# Patient Record
Sex: FEMALE | Race: WHITE | Employment: OTHER | URBAN - METROPOLITAN AREA
[De-identification: names, ages, dates, MRNs, and addresses within clinical notes are randomized per-mention and may not be internally consistent; named-entity substitution may affect disease eponyms.]

---

## 2017-01-01 ENCOUNTER — TELEPHONE (OUTPATIENT)
Dept: PHARMACY | Facility: CLINIC | Age: 72
End: 2017-01-01

## 2017-01-01 ENCOUNTER — CARE COORDINATION (OUTPATIENT)
Dept: CARE COORDINATION | Age: 72
End: 2017-01-01

## 2017-01-01 ENCOUNTER — TELEPHONE (OUTPATIENT)
Dept: FAMILY MEDICINE CLINIC | Age: 72
End: 2017-01-01

## 2017-01-01 ENCOUNTER — HOSPITAL ENCOUNTER (OUTPATIENT)
Dept: SURGERY | Age: 72
Discharge: HOME | End: 2017-11-02
Attending: OPHTHALMOLOGY | Admitting: OPHTHALMOLOGY

## 2017-01-01 ENCOUNTER — HOSPITAL ENCOUNTER (OUTPATIENT)
Dept: MAMMOGRAPHY | Age: 72
Discharge: HOME | End: 2017-05-10
Attending: FAMILY MEDICINE | Admitting: FAMILY MEDICINE

## 2017-01-01 ENCOUNTER — OFFICE VISIT (OUTPATIENT)
Dept: CARDIOLOGY CLINIC | Age: 72
End: 2017-01-01

## 2017-01-01 ENCOUNTER — OFFICE VISIT (OUTPATIENT)
Dept: FAMILY MEDICINE CLINIC | Age: 72
End: 2017-01-01

## 2017-01-01 ENCOUNTER — HOSPITAL ENCOUNTER (OUTPATIENT)
Dept: WOMENS IMAGING | Age: 72
Discharge: HOME | End: 2017-05-17
Attending: FAMILY MEDICINE | Admitting: FAMILY MEDICINE

## 2017-01-01 ENCOUNTER — CARE COORDINATOR VISIT (OUTPATIENT)
Dept: CARE COORDINATION | Age: 72
End: 2017-01-01

## 2017-01-01 ENCOUNTER — TELEPHONE (OUTPATIENT)
Dept: CARDIOLOGY CLINIC | Age: 72
End: 2017-01-01

## 2017-01-01 VITALS
OXYGEN SATURATION: 96 % | HEIGHT: 61 IN | WEIGHT: 196 LBS | SYSTOLIC BLOOD PRESSURE: 122 MMHG | DIASTOLIC BLOOD PRESSURE: 52 MMHG | HEART RATE: 84 BPM | BODY MASS INDEX: 37 KG/M2

## 2017-01-01 VITALS
RESPIRATION RATE: 19 BRPM | HEART RATE: 86 BPM | SYSTOLIC BLOOD PRESSURE: 132 MMHG | DIASTOLIC BLOOD PRESSURE: 68 MMHG | BODY MASS INDEX: 35.52 KG/M2 | OXYGEN SATURATION: 97 % | WEIGHT: 188 LBS

## 2017-01-01 VITALS
RESPIRATION RATE: 16 BRPM | SYSTOLIC BLOOD PRESSURE: 154 MMHG | OXYGEN SATURATION: 98 % | DIASTOLIC BLOOD PRESSURE: 84 MMHG | HEART RATE: 80 BPM | BODY MASS INDEX: 36.84 KG/M2 | WEIGHT: 195 LBS

## 2017-01-01 VITALS
DIASTOLIC BLOOD PRESSURE: 70 MMHG | WEIGHT: 192 LBS | RESPIRATION RATE: 16 BRPM | SYSTOLIC BLOOD PRESSURE: 122 MMHG | OXYGEN SATURATION: 97 % | BODY MASS INDEX: 36.25 KG/M2 | HEART RATE: 66 BPM | HEIGHT: 61 IN

## 2017-01-01 VITALS
WEIGHT: 183 LBS | HEART RATE: 84 BPM | DIASTOLIC BLOOD PRESSURE: 60 MMHG | BODY MASS INDEX: 34.58 KG/M2 | RESPIRATION RATE: 16 BRPM | SYSTOLIC BLOOD PRESSURE: 130 MMHG | OXYGEN SATURATION: 96 %

## 2017-01-01 VITALS
SYSTOLIC BLOOD PRESSURE: 138 MMHG | RESPIRATION RATE: 16 BRPM | DIASTOLIC BLOOD PRESSURE: 52 MMHG | WEIGHT: 188 LBS | BODY MASS INDEX: 35.5 KG/M2 | HEART RATE: 84 BPM | HEIGHT: 61 IN

## 2017-01-01 VITALS
DIASTOLIC BLOOD PRESSURE: 70 MMHG | BODY MASS INDEX: 35.71 KG/M2 | WEIGHT: 189 LBS | OXYGEN SATURATION: 90 % | SYSTOLIC BLOOD PRESSURE: 134 MMHG | HEART RATE: 72 BPM

## 2017-01-01 VITALS
SYSTOLIC BLOOD PRESSURE: 120 MMHG | HEART RATE: 74 BPM | OXYGEN SATURATION: 96 % | WEIGHT: 182 LBS | RESPIRATION RATE: 17 BRPM | BODY MASS INDEX: 34.39 KG/M2 | DIASTOLIC BLOOD PRESSURE: 68 MMHG

## 2017-01-01 VITALS
BODY MASS INDEX: 35.87 KG/M2 | HEART RATE: 68 BPM | RESPIRATION RATE: 14 BRPM | DIASTOLIC BLOOD PRESSURE: 62 MMHG | SYSTOLIC BLOOD PRESSURE: 144 MMHG | HEIGHT: 61 IN | WEIGHT: 190 LBS

## 2017-01-01 DIAGNOSIS — Z12.31 SCREENING MAMMOGRAM, ENCOUNTER FOR: ICD-10-CM

## 2017-01-01 DIAGNOSIS — G31.84 MILD COGNITIVE IMPAIRMENT WITH MEMORY LOSS: Primary | ICD-10-CM

## 2017-01-01 DIAGNOSIS — E11.42 TYPE 2 DIABETES MELLITUS WITH DIABETIC POLYNEUROPATHY, WITH LONG-TERM CURRENT USE OF INSULIN (HCC): Primary | Chronic | ICD-10-CM

## 2017-01-01 DIAGNOSIS — I20.8 ANGINA OF EFFORT (HCC): ICD-10-CM

## 2017-01-01 DIAGNOSIS — E11.42 TYPE 2 DIABETES MELLITUS WITH DIABETIC POLYNEUROPATHY, WITH LONG-TERM CURRENT USE OF INSULIN (HCC): Chronic | ICD-10-CM

## 2017-01-01 DIAGNOSIS — Z79.4 TYPE 2 DIABETES MELLITUS WITH DIABETIC POLYNEUROPATHY, WITH LONG-TERM CURRENT USE OF INSULIN (HCC): Primary | Chronic | ICD-10-CM

## 2017-01-01 DIAGNOSIS — D50.8 IRON DEFICIENCY ANEMIA SECONDARY TO INADEQUATE DIETARY IRON INTAKE: ICD-10-CM

## 2017-01-01 DIAGNOSIS — F33.41 MAJOR DEPRESSIVE DISORDER, RECURRENT EPISODE, IN PARTIAL REMISSION (HCC): ICD-10-CM

## 2017-01-01 DIAGNOSIS — Z79.4 TYPE 2 DIABETES MELLITUS WITH DIABETIC POLYNEUROPATHY, WITH LONG-TERM CURRENT USE OF INSULIN (HCC): Chronic | ICD-10-CM

## 2017-01-01 DIAGNOSIS — I20.8 ANGINA EFFORT: ICD-10-CM

## 2017-01-01 DIAGNOSIS — I27.20 PULMONARY HTN (HCC): ICD-10-CM

## 2017-01-01 DIAGNOSIS — J43.9 PULMONARY EMPHYSEMA, UNSPECIFIED EMPHYSEMA TYPE (HCC): Chronic | ICD-10-CM

## 2017-01-01 DIAGNOSIS — I25.10 CORONARY ARTERY DISEASE INVOLVING NATIVE CORONARY ARTERY OF NATIVE HEART WITHOUT ANGINA PECTORIS: ICD-10-CM

## 2017-01-01 DIAGNOSIS — H35.00 RETINOPATHY: ICD-10-CM

## 2017-01-01 DIAGNOSIS — I10 ESSENTIAL HYPERTENSION: Chronic | ICD-10-CM

## 2017-01-01 DIAGNOSIS — I25.10 CAD S/P PERCUTANEOUS CORONARY ANGIOPLASTY: Primary | Chronic | ICD-10-CM

## 2017-01-01 DIAGNOSIS — Z23 NEED FOR INFLUENZA VACCINATION: ICD-10-CM

## 2017-01-01 DIAGNOSIS — Z12.31 ENCOUNTER FOR SCREENING MAMMOGRAM FOR BREAST CANCER: ICD-10-CM

## 2017-01-01 DIAGNOSIS — F33.8 SEASONAL AFFECTIVE DISORDER (HCC): ICD-10-CM

## 2017-01-01 DIAGNOSIS — D50.9 IRON DEFICIENCY ANEMIA, UNSPECIFIED IRON DEFICIENCY ANEMIA TYPE: ICD-10-CM

## 2017-01-01 DIAGNOSIS — G31.84 MILD COGNITIVE IMPAIRMENT WITH MEMORY LOSS: ICD-10-CM

## 2017-01-01 DIAGNOSIS — I50.42 CHRONIC COMBINED SYSTOLIC AND DIASTOLIC CHF (CONGESTIVE HEART FAILURE) (HCC): Chronic | ICD-10-CM

## 2017-01-01 DIAGNOSIS — R53.83 FATIGUE, UNSPECIFIED TYPE: ICD-10-CM

## 2017-01-01 DIAGNOSIS — Z98.61 CAD S/P PERCUTANEOUS CORONARY ANGIOPLASTY: Primary | Chronic | ICD-10-CM

## 2017-01-01 DIAGNOSIS — J41.0 SIMPLE CHRONIC BRONCHITIS (HCC): ICD-10-CM

## 2017-01-01 DIAGNOSIS — K52.9 GASTROENTERITIS: ICD-10-CM

## 2017-01-01 DIAGNOSIS — F33.41 MAJOR DEPRESSIVE DISORDER, RECURRENT EPISODE, IN PARTIAL REMISSION (HCC): Chronic | ICD-10-CM

## 2017-01-01 DIAGNOSIS — J41.0 SIMPLE CHRONIC BRONCHITIS (HCC): Primary | ICD-10-CM

## 2017-01-01 DIAGNOSIS — F51.01 PRIMARY INSOMNIA: ICD-10-CM

## 2017-01-01 DIAGNOSIS — R26.81 UNSTABLE GAIT: Primary | ICD-10-CM

## 2017-01-01 DIAGNOSIS — E78.2 MIXED HYPERLIPIDEMIA: Chronic | ICD-10-CM

## 2017-01-01 DIAGNOSIS — H10.13 ALLERGIC CONJUNCTIVITIS, BILATERAL: ICD-10-CM

## 2017-01-01 DIAGNOSIS — K29.01 GASTROINTESTINAL HEMORRHAGE ASSOCIATED WITH ACUTE GASTRITIS: ICD-10-CM

## 2017-01-01 DIAGNOSIS — I35.0 AORTIC STENOSIS, MODERATE: ICD-10-CM

## 2017-01-01 DIAGNOSIS — J96.11 CHRONIC RESPIRATORY FAILURE WITH HYPOXIA (HCC): Chronic | ICD-10-CM

## 2017-01-01 DIAGNOSIS — I27.20 PULMONARY HTN (HCC): Primary | ICD-10-CM

## 2017-01-01 DIAGNOSIS — R26.81 UNSTABLE GAIT: ICD-10-CM

## 2017-01-01 DIAGNOSIS — N39.41 URGE INCONTINENCE: ICD-10-CM

## 2017-01-01 DIAGNOSIS — M17.11 PRIMARY OSTEOARTHRITIS OF RIGHT KNEE: Chronic | ICD-10-CM

## 2017-01-01 DIAGNOSIS — J96.11 CHRONIC RESPIRATORY FAILURE WITH HYPOXIA (HCC): ICD-10-CM

## 2017-01-01 DIAGNOSIS — Z79.4 ENCOUNTER FOR LONG-TERM (CURRENT) USE OF INSULIN (HCC): Chronic | ICD-10-CM

## 2017-01-01 DIAGNOSIS — Z71.89 ENCOUNTER FOR MEDICATION REVIEW AND COUNSELING: Primary | ICD-10-CM

## 2017-01-01 DIAGNOSIS — M71.332 SYNOVIAL CYST OF WRIST, LEFT: ICD-10-CM

## 2017-01-01 DIAGNOSIS — H25.011 CATARACT CORTICAL, SENILE, RIGHT: ICD-10-CM

## 2017-01-01 DIAGNOSIS — I51.9 DIASTOLIC DYSFUNCTION, LEFT VENTRICLE: ICD-10-CM

## 2017-01-01 DIAGNOSIS — E11.8 DIABETIC FOOT (HCC): ICD-10-CM

## 2017-01-01 DIAGNOSIS — J44.1 COPD, FREQUENT EXACERBATIONS (HCC): ICD-10-CM

## 2017-01-01 DIAGNOSIS — I10 ESSENTIAL HYPERTENSION: Primary | Chronic | ICD-10-CM

## 2017-01-01 DIAGNOSIS — K31.819 AVM (ARTERIOVENOUS MALFORMATION) OF DUODENUM, ACQUIRED: ICD-10-CM

## 2017-01-01 LAB
A/G RATIO: 1.7 (ref 1.1–2.2)
ALBUMIN SERPL-MCNC: 3.8 G/DL (ref 3.4–5)
ALP BLD-CCNC: 58 U/L (ref 40–129)
ALT SERPL-CCNC: 11 U/L (ref 10–40)
ANION GAP SERPL CALCULATED.3IONS-SCNC: 16 MMOL/L (ref 3–16)
ANION GAP SERPL CALCULATED.3IONS-SCNC: 16 MMOL/L (ref 3–16)
ANION GAP SERPL CALCULATED.3IONS-SCNC: 19 MMOL/L (ref 3–16)
AST SERPL-CCNC: 14 U/L (ref 15–37)
BASOPHILS ABSOLUTE: 0 K/UL (ref 0–0.2)
BASOPHILS ABSOLUTE: 0 K/UL (ref 0–0.2)
BASOPHILS ABSOLUTE: 0.1 K/UL (ref 0–0.2)
BASOPHILS RELATIVE PERCENT: 0.2 %
BASOPHILS RELATIVE PERCENT: 0.4 %
BASOPHILS RELATIVE PERCENT: 0.6 %
BILIRUB SERPL-MCNC: <0.2 MG/DL (ref 0–1)
BUN BLDV-MCNC: 19 MG/DL (ref 7–20)
BUN BLDV-MCNC: 21 MG/DL (ref 7–20)
BUN BLDV-MCNC: 32 MG/DL (ref 7–20)
CALCIUM SERPL-MCNC: 8.8 MG/DL (ref 8.3–10.6)
CALCIUM SERPL-MCNC: 9.1 MG/DL (ref 8.3–10.6)
CALCIUM SERPL-MCNC: 9.3 MG/DL (ref 8.3–10.6)
CHLORIDE BLD-SCNC: 100 MMOL/L (ref 99–110)
CHLORIDE BLD-SCNC: 102 MMOL/L (ref 99–110)
CHLORIDE BLD-SCNC: 102 MMOL/L (ref 99–110)
CO2: 21 MMOL/L (ref 21–32)
CO2: 24 MMOL/L (ref 21–32)
CO2: 27 MMOL/L (ref 21–32)
CREAT SERPL-MCNC: 0.7 MG/DL (ref 0.6–1.2)
CREAT SERPL-MCNC: 0.9 MG/DL (ref 0.6–1.2)
CREAT SERPL-MCNC: 0.9 MG/DL (ref 0.6–1.2)
CREATININE URINE: 43.7 MG/DL (ref 28–259)
DIABETIC RETINOPATHY: NORMAL
EOSINOPHILS ABSOLUTE: 0.2 K/UL (ref 0–0.6)
EOSINOPHILS RELATIVE PERCENT: 1.7 %
EOSINOPHILS RELATIVE PERCENT: 1.9 %
EOSINOPHILS RELATIVE PERCENT: 1.9 %
GFR AFRICAN AMERICAN: >60
GFR NON-AFRICAN AMERICAN: >60
GLOBULIN: 2.3 G/DL
GLUCOSE BLD-MCNC: 104 MG/DL (ref 70–99)
GLUCOSE BLD-MCNC: 150 MG/DL (ref 70–99)
GLUCOSE BLD-MCNC: 204 MG/DL (ref 70–99)
HBA1C MFR BLD: 8.1 %
HBA1C MFR BLD: 8.4 %
HBA1C MFR BLD: 8.5 %
HBA1C MFR BLD: 8.6 %
HCT VFR BLD CALC: 36.1 % (ref 36–48)
HCT VFR BLD CALC: 36.3 % (ref 36–48)
HCT VFR BLD CALC: 37.3 % (ref 36–48)
HEMOGLOBIN: 11.7 G/DL (ref 12–16)
HEMOGLOBIN: 12.1 G/DL (ref 12–16)
HEMOGLOBIN: 12.3 G/DL (ref 12–16)
IRON SATURATION: 10 % (ref 15–50)
IRON: 31 UG/DL (ref 37–145)
LYMPHOCYTES ABSOLUTE: 1.2 K/UL (ref 1–5.1)
LYMPHOCYTES ABSOLUTE: 1.4 K/UL (ref 1–5.1)
LYMPHOCYTES ABSOLUTE: 1.5 K/UL (ref 1–5.1)
LYMPHOCYTES RELATIVE PERCENT: 11.7 %
LYMPHOCYTES RELATIVE PERCENT: 12.6 %
LYMPHOCYTES RELATIVE PERCENT: 18.3 %
MCH RBC QN AUTO: 32.1 PG (ref 26–34)
MCH RBC QN AUTO: 32.6 PG (ref 26–34)
MCH RBC QN AUTO: 33.1 PG (ref 26–34)
MCHC RBC AUTO-ENTMCNC: 32.4 G/DL (ref 31–36)
MCHC RBC AUTO-ENTMCNC: 33 G/DL (ref 31–36)
MCHC RBC AUTO-ENTMCNC: 33.4 G/DL (ref 31–36)
MCV RBC AUTO: 98.7 FL (ref 80–100)
MCV RBC AUTO: 99 FL (ref 80–100)
MCV RBC AUTO: 99.3 FL (ref 80–100)
MICROALBUMIN UR-MCNC: 7.6 MG/DL
MICROALBUMIN/CREAT UR-RTO: 173.9 MG/G (ref 0–30)
MONOCYTES ABSOLUTE: 0.6 K/UL (ref 0–1.3)
MONOCYTES ABSOLUTE: 0.6 K/UL (ref 0–1.3)
MONOCYTES ABSOLUTE: 0.7 K/UL (ref 0–1.3)
MONOCYTES RELATIVE PERCENT: 6.1 %
MONOCYTES RELATIVE PERCENT: 6.2 %
MONOCYTES RELATIVE PERCENT: 7.4 %
NEUTROPHILS ABSOLUTE: 5.8 K/UL (ref 1.7–7.7)
NEUTROPHILS ABSOLUTE: 7.9 K/UL (ref 1.7–7.7)
NEUTROPHILS ABSOLUTE: 8.5 K/UL (ref 1.7–7.7)
NEUTROPHILS RELATIVE PERCENT: 72.2 %
NEUTROPHILS RELATIVE PERCENT: 79 %
NEUTROPHILS RELATIVE PERCENT: 79.8 %
PDW BLD-RTO: 13.8 % (ref 12.4–15.4)
PDW BLD-RTO: 14.4 % (ref 12.4–15.4)
PDW BLD-RTO: 14.6 % (ref 12.4–15.4)
PLATELET # BLD: 233 K/UL (ref 135–450)
PLATELET # BLD: 262 K/UL (ref 135–450)
PLATELET # BLD: 285 K/UL (ref 135–450)
PMV BLD AUTO: 8.2 FL (ref 5–10.5)
PMV BLD AUTO: 8.4 FL (ref 5–10.5)
PMV BLD AUTO: 8.6 FL (ref 5–10.5)
POTASSIUM SERPL-SCNC: 3.8 MMOL/L (ref 3.5–5.1)
POTASSIUM SERPL-SCNC: 3.9 MMOL/L (ref 3.5–5.1)
POTASSIUM SERPL-SCNC: 4 MMOL/L (ref 3.5–5.1)
PRO-BNP: 556 PG/ML (ref 0–124)
RBC # BLD: 3.64 M/UL (ref 4–5.2)
RBC # BLD: 3.65 M/UL (ref 4–5.2)
RBC # BLD: 3.78 M/UL (ref 4–5.2)
SODIUM BLD-SCNC: 140 MMOL/L (ref 136–145)
SODIUM BLD-SCNC: 142 MMOL/L (ref 136–145)
SODIUM BLD-SCNC: 145 MMOL/L (ref 136–145)
TOTAL IRON BINDING CAPACITY: 308 UG/DL (ref 260–445)
TOTAL PROTEIN: 6.1 G/DL (ref 6.4–8.2)
TSH SERPL DL<=0.05 MIU/L-ACNC: 1.18 UIU/ML (ref 0.27–4.2)
WBC # BLD: 10.8 K/UL (ref 4–11)
WBC # BLD: 8 K/UL (ref 4–11)
WBC # BLD: 9.9 K/UL (ref 4–11)

## 2017-01-01 PROCEDURE — G8598 ASA/ANTIPLAT THER USED: HCPCS | Performed by: FAMILY MEDICINE

## 2017-01-01 PROCEDURE — 3023F SPIROM DOC REV: CPT | Performed by: FAMILY MEDICINE

## 2017-01-01 PROCEDURE — G8399 PT W/DXA RESULTS DOCUMENT: HCPCS | Performed by: FAMILY MEDICINE

## 2017-01-01 PROCEDURE — G8484 FLU IMMUNIZE NO ADMIN: HCPCS | Performed by: FAMILY MEDICINE

## 2017-01-01 PROCEDURE — 3014F SCREEN MAMMO DOC REV: CPT | Performed by: FAMILY MEDICINE

## 2017-01-01 PROCEDURE — 1123F ACP DISCUSS/DSCN MKR DOCD: CPT | Performed by: FAMILY MEDICINE

## 2017-01-01 PROCEDURE — 3017F COLORECTAL CA SCREEN DOC REV: CPT | Performed by: FAMILY MEDICINE

## 2017-01-01 PROCEDURE — G8926 SPIRO NO PERF OR DOC: HCPCS | Performed by: FAMILY MEDICINE

## 2017-01-01 PROCEDURE — 3045F PR MOST RECENT HEMOGLOBIN A1C LEVEL 7.0-9.0%: CPT | Performed by: INTERNAL MEDICINE

## 2017-01-01 PROCEDURE — 83036 HEMOGLOBIN GLYCOSYLATED A1C: CPT | Performed by: FAMILY MEDICINE

## 2017-01-01 PROCEDURE — 4040F PNEUMOC VAC/ADMIN/RCVD: CPT | Performed by: FAMILY MEDICINE

## 2017-01-01 PROCEDURE — 3045F PR MOST RECENT HEMOGLOBIN A1C LEVEL 7.0-9.0%: CPT | Performed by: FAMILY MEDICINE

## 2017-01-01 PROCEDURE — 99214 OFFICE O/P EST MOD 30 MIN: CPT | Performed by: INTERNAL MEDICINE

## 2017-01-01 PROCEDURE — 1123F ACP DISCUSS/DSCN MKR DOCD: CPT | Performed by: INTERNAL MEDICINE

## 2017-01-01 PROCEDURE — G8399 PT W/DXA RESULTS DOCUMENT: HCPCS | Performed by: INTERNAL MEDICINE

## 2017-01-01 PROCEDURE — G8599 NO ASA/ANTIPLAT THER USE RNG: HCPCS | Performed by: FAMILY MEDICINE

## 2017-01-01 PROCEDURE — G8427 DOCREV CUR MEDS BY ELIG CLIN: HCPCS | Performed by: FAMILY MEDICINE

## 2017-01-01 PROCEDURE — G8598 ASA/ANTIPLAT THER USED: HCPCS | Performed by: INTERNAL MEDICINE

## 2017-01-01 PROCEDURE — 3046F HEMOGLOBIN A1C LEVEL >9.0%: CPT | Performed by: FAMILY MEDICINE

## 2017-01-01 PROCEDURE — G8417 CALC BMI ABV UP PARAM F/U: HCPCS | Performed by: FAMILY MEDICINE

## 2017-01-01 PROCEDURE — G8484 FLU IMMUNIZE NO ADMIN: HCPCS | Performed by: INTERNAL MEDICINE

## 2017-01-01 PROCEDURE — 1036F TOBACCO NON-USER: CPT | Performed by: FAMILY MEDICINE

## 2017-01-01 PROCEDURE — 99213 OFFICE O/P EST LOW 20 MIN: CPT | Performed by: FAMILY MEDICINE

## 2017-01-01 PROCEDURE — G8599 NO ASA/ANTIPLAT THER USE RNG: HCPCS | Performed by: INTERNAL MEDICINE

## 2017-01-01 PROCEDURE — 99214 OFFICE O/P EST MOD 30 MIN: CPT | Performed by: FAMILY MEDICINE

## 2017-01-01 PROCEDURE — G8419 CALC BMI OUT NRM PARAM NOF/U: HCPCS | Performed by: FAMILY MEDICINE

## 2017-01-01 PROCEDURE — 1090F PRES/ABSN URINE INCON ASSESS: CPT | Performed by: FAMILY MEDICINE

## 2017-01-01 PROCEDURE — 1090F PRES/ABSN URINE INCON ASSESS: CPT | Performed by: INTERNAL MEDICINE

## 2017-01-01 PROCEDURE — G8427 DOCREV CUR MEDS BY ELIG CLIN: HCPCS | Performed by: INTERNAL MEDICINE

## 2017-01-01 PROCEDURE — 4040F PNEUMOC VAC/ADMIN/RCVD: CPT | Performed by: INTERNAL MEDICINE

## 2017-01-01 PROCEDURE — 1036F TOBACCO NON-USER: CPT | Performed by: INTERNAL MEDICINE

## 2017-01-01 PROCEDURE — G8417 CALC BMI ABV UP PARAM F/U: HCPCS | Performed by: INTERNAL MEDICINE

## 2017-01-01 PROCEDURE — 3017F COLORECTAL CA SCREEN DOC REV: CPT | Performed by: INTERNAL MEDICINE

## 2017-01-01 PROCEDURE — G0008 ADMIN INFLUENZA VIRUS VAC: HCPCS | Performed by: FAMILY MEDICINE

## 2017-01-01 PROCEDURE — 3014F SCREEN MAMMO DOC REV: CPT | Performed by: INTERNAL MEDICINE

## 2017-01-01 PROCEDURE — 90688 IIV4 VACCINE SPLT 0.5 ML IM: CPT | Performed by: FAMILY MEDICINE

## 2017-01-01 PROCEDURE — G8926 SPIRO NO PERF OR DOC: HCPCS | Performed by: INTERNAL MEDICINE

## 2017-01-01 PROCEDURE — 3023F SPIROM DOC REV: CPT | Performed by: INTERNAL MEDICINE

## 2017-01-01 RX ORDER — RANOLAZINE 500 MG/1
500 TABLET, EXTENDED RELEASE ORAL 2 TIMES DAILY
Qty: 60 TABLET | Refills: 5 | Status: SHIPPED | OUTPATIENT
Start: 2017-01-01 | End: 2017-01-01 | Stop reason: SDUPTHER

## 2017-01-01 RX ORDER — PANTOPRAZOLE SODIUM 40 MG/1
40 TABLET, DELAYED RELEASE ORAL DAILY
Qty: 30 TABLET | Refills: 0 | Status: SHIPPED | OUTPATIENT
Start: 2017-01-01 | End: 2017-01-01 | Stop reason: SDUPTHER

## 2017-01-01 RX ORDER — ROSUVASTATIN CALCIUM 10 MG/1
10 TABLET, COATED ORAL DAILY
Qty: 30 TABLET | Refills: 5 | Status: SHIPPED | OUTPATIENT
Start: 2017-01-01 | End: 2017-01-01 | Stop reason: SDUPTHER

## 2017-01-01 RX ORDER — FERROUS SULFATE 325(65) MG
TABLET ORAL
Qty: 60 TABLET | Refills: 5 | Status: SHIPPED | OUTPATIENT
Start: 2017-01-01 | End: 2017-01-01 | Stop reason: SDUPTHER

## 2017-01-01 RX ORDER — CARVEDILOL 3.12 MG/1
3.12 TABLET ORAL 2 TIMES DAILY WITH MEALS
Qty: 60 TABLET | Refills: 5 | Status: SHIPPED | OUTPATIENT
Start: 2017-01-01

## 2017-01-01 RX ORDER — DONEPEZIL HYDROCHLORIDE 5 MG/1
5 TABLET, FILM COATED ORAL NIGHTLY
Qty: 30 TABLET | Refills: 3 | Status: SHIPPED | OUTPATIENT
Start: 2017-01-01 | End: 2017-01-01 | Stop reason: SINTOL

## 2017-01-01 RX ORDER — TROPICAMIDE 10 MG/ML
1 SOLUTION/ DROPS OPHTHALMIC SEE ADMIN INSTRUCTIONS
Status: CANCELLED | OUTPATIENT
Start: 2017-01-01

## 2017-01-01 RX ORDER — ISOSORBIDE MONONITRATE 60 MG/1
TABLET, EXTENDED RELEASE ORAL
Qty: 30 TABLET | Refills: 4 | Status: SHIPPED | OUTPATIENT
Start: 2017-01-01

## 2017-01-01 RX ORDER — CYCLOPENTOLATE HYDROCHLORIDE 10 MG/ML
1 SOLUTION/ DROPS OPHTHALMIC SEE ADMIN INSTRUCTIONS
Status: CANCELLED | OUTPATIENT
Start: 2017-01-01

## 2017-01-01 RX ORDER — PANTOPRAZOLE SODIUM 40 MG/1
40 TABLET, DELAYED RELEASE ORAL DAILY
Qty: 30 TABLET | Refills: 5 | Status: SHIPPED | OUTPATIENT
Start: 2017-01-01 | End: 2017-01-01 | Stop reason: SDUPTHER

## 2017-01-01 RX ORDER — LISINOPRIL 20 MG/1
20 TABLET ORAL DAILY
Qty: 30 TABLET | Refills: 2 | Status: SHIPPED | OUTPATIENT
Start: 2017-01-01 | End: 2018-01-01 | Stop reason: SDUPTHER

## 2017-01-01 RX ORDER — FUROSEMIDE 20 MG/1
20 TABLET ORAL 2 TIMES DAILY
Qty: 60 TABLET | Refills: 3 | Status: SHIPPED | OUTPATIENT
Start: 2017-01-01 | End: 2017-01-01 | Stop reason: SDUPTHER

## 2017-01-01 RX ORDER — PEN NEEDLE, DIABETIC 29 G X1/2"
NEEDLE, DISPOSABLE MISCELLANEOUS
Qty: 100 EACH | Refills: 12 | Status: SHIPPED | OUTPATIENT
Start: 2017-01-01

## 2017-01-01 RX ORDER — FERROUS SULFATE 325(65) MG
TABLET ORAL
Qty: 60 TABLET | Refills: 5 | Status: SHIPPED | OUTPATIENT
Start: 2017-01-01

## 2017-01-01 RX ORDER — ONDANSETRON HYDROCHLORIDE 8 MG/1
8 TABLET, FILM COATED ORAL EVERY 8 HOURS PRN
Qty: 30 TABLET | Refills: 5 | Status: SHIPPED | OUTPATIENT
Start: 2017-01-01

## 2017-01-01 RX ORDER — LISINOPRIL 20 MG/1
20 TABLET ORAL DAILY
Qty: 30 TABLET | Refills: 2 | Status: SHIPPED | OUTPATIENT
Start: 2017-01-01 | End: 2017-01-01 | Stop reason: SDUPTHER

## 2017-01-01 RX ORDER — FLUOXETINE 10 MG/1
CAPSULE ORAL
Qty: 90 CAPSULE | Refills: 3 | Status: SHIPPED | OUTPATIENT
Start: 2017-01-01 | End: 2017-01-01 | Stop reason: SDUPTHER

## 2017-01-01 RX ORDER — ROSUVASTATIN CALCIUM 10 MG/1
10 TABLET, COATED ORAL DAILY
Qty: 30 TABLET | Refills: 5 | Status: SHIPPED | OUTPATIENT
Start: 2017-01-01

## 2017-01-01 RX ORDER — ISOSORBIDE MONONITRATE 60 MG/1
TABLET, EXTENDED RELEASE ORAL
Qty: 30 TABLET | Refills: 3 | Status: SHIPPED | OUTPATIENT
Start: 2017-01-01 | End: 2017-01-01 | Stop reason: SDUPTHER

## 2017-01-01 RX ORDER — LISINOPRIL 20 MG/1
40 TABLET ORAL DAILY
Qty: 90 TABLET | Refills: 3 | Status: SHIPPED | OUTPATIENT
Start: 2017-01-01 | End: 2017-01-01 | Stop reason: DRUGHIGH

## 2017-01-01 RX ORDER — PHENYLEPHRINE HCL 2.5 %
1 DROPS OPHTHALMIC (EYE) SEE ADMIN INSTRUCTIONS
Status: CANCELLED | OUTPATIENT
Start: 2017-01-01

## 2017-01-01 RX ORDER — FLUOXETINE 10 MG/1
CAPSULE ORAL
Qty: 30 CAPSULE | Refills: 0 | Status: SHIPPED | OUTPATIENT
Start: 2017-01-01 | End: 2017-01-01 | Stop reason: SDUPTHER

## 2017-01-01 RX ORDER — FUROSEMIDE 20 MG/1
20 TABLET ORAL 2 TIMES DAILY
Qty: 60 TABLET | Refills: 3 | Status: SHIPPED | OUTPATIENT
Start: 2017-01-01

## 2017-01-01 RX ORDER — FUROSEMIDE 20 MG/1
20 TABLET ORAL 2 TIMES DAILY
Qty: 90 TABLET | Refills: 3 | Status: SHIPPED | OUTPATIENT
Start: 2017-01-01 | End: 2017-01-01 | Stop reason: SDUPTHER

## 2017-01-01 RX ORDER — FLUOXETINE HYDROCHLORIDE 20 MG/1
20 CAPSULE ORAL DAILY
Qty: 30 CAPSULE | Refills: 5 | Status: SHIPPED | OUTPATIENT
Start: 2017-01-01 | End: 2017-01-01 | Stop reason: SDUPTHER

## 2017-01-01 RX ORDER — DICLOFENAC SODIUM 1 MG/ML
1 SOLUTION/ DROPS OPHTHALMIC 4 TIMES DAILY
Status: CANCELLED | OUTPATIENT
Start: 2017-01-01

## 2017-01-01 RX ORDER — NITROGLYCERIN 0.4 MG/1
0.4 TABLET SUBLINGUAL EVERY 5 MIN PRN
Qty: 25 TABLET | Refills: 3 | Status: SHIPPED | OUTPATIENT
Start: 2017-01-01

## 2017-01-01 RX ORDER — MONTELUKAST SODIUM 10 MG/1
10 TABLET ORAL NIGHTLY
Qty: 30 TABLET | Refills: 3 | Status: SHIPPED | OUTPATIENT
Start: 2017-01-01

## 2017-01-01 RX ORDER — MONTELUKAST SODIUM 10 MG/1
10 TABLET ORAL NIGHTLY
Qty: 30 TABLET | Refills: 3 | Status: SHIPPED | OUTPATIENT
Start: 2017-01-01 | End: 2017-01-01 | Stop reason: SDUPTHER

## 2017-01-01 RX ORDER — OXYBUTYNIN CHLORIDE 5 MG/1
5 TABLET, EXTENDED RELEASE ORAL DAILY
Qty: 90 TABLET | Refills: 3 | Status: SHIPPED | OUTPATIENT
Start: 2017-01-01

## 2017-01-01 RX ORDER — RANOLAZINE 500 MG/1
TABLET, FILM COATED, EXTENDED RELEASE ORAL
Qty: 60 TABLET | Refills: 5 | Status: SHIPPED | OUTPATIENT
Start: 2017-01-01

## 2017-01-01 RX ORDER — LANOLIN ALCOHOL/MO/W.PET/CERES
3 CREAM (GRAM) TOPICAL DAILY
Qty: 30 TABLET | Refills: 5 | Status: SHIPPED | OUTPATIENT
Start: 2017-01-01

## 2017-01-01 RX ORDER — SODIUM CHLORIDE 9 MG/ML
INJECTION, SOLUTION INTRAVENOUS CONTINUOUS
Status: CANCELLED | OUTPATIENT
Start: 2017-01-01

## 2017-01-01 RX ORDER — ROSUVASTATIN CALCIUM 10 MG/1
TABLET, COATED ORAL
Qty: 30 TABLET | Refills: 5 | Status: SHIPPED | OUTPATIENT
Start: 2017-01-01 | End: 2017-01-01 | Stop reason: SDUPTHER

## 2017-01-01 RX ORDER — ONDANSETRON HYDROCHLORIDE 8 MG/1
8 TABLET, FILM COATED ORAL EVERY 8 HOURS PRN
Qty: 30 TABLET | Refills: 1 | Status: SHIPPED | OUTPATIENT
Start: 2017-01-01 | End: 2017-01-01 | Stop reason: SDUPTHER

## 2017-01-01 ASSESSMENT — ENCOUNTER SYMPTOMS
CHEST TIGHTNESS: 0
SINUS PRESSURE: 0
BLOOD IN STOOL: 0
VOMITING: 0
COUGH: 0
SHORTNESS OF BREATH: 0
SHORTNESS OF BREATH: 0
WHEEZING: 0
BACK PAIN: 0
SHORTNESS OF BREATH: 0
ABDOMINAL PAIN: 0
DIARRHEA: 0
CONSTIPATION: 0
EYE REDNESS: 1
DIARRHEA: 0
SINUS PRESSURE: 0
CHEST TIGHTNESS: 0
ABDOMINAL PAIN: 0
SINUS PRESSURE: 0
NAUSEA: 1
CONSTIPATION: 0
CHEST TIGHTNESS: 0
BLOOD IN STOOL: 0
SHORTNESS OF BREATH: 0
CHEST TIGHTNESS: 0
EYE ITCHING: 1
WHEEZING: 0
RHINORRHEA: 0
VOMITING: 0
BACK PAIN: 0
NAUSEA: 1
SORE THROAT: 0
COUGH: 0
BLOOD IN STOOL: 0
SORE THROAT: 0
BLOOD IN STOOL: 0
WHEEZING: 0
ABDOMINAL PAIN: 0
DIARRHEA: 0
VOMITING: 0
BLOOD IN STOOL: 0
SHORTNESS OF BREATH: 0
NAUSEA: 1
SINUS PRESSURE: 0
WHEEZING: 0
BACK PAIN: 0
CHEST TIGHTNESS: 0
NAUSEA: 1
COUGH: 0
DIARRHEA: 0
CONSTIPATION: 0
SORE THROAT: 0
ABDOMINAL PAIN: 0
SORE THROAT: 0
PHOTOPHOBIA: 0
COUGH: 0
COUGH: 0
RHINORRHEA: 0
SHORTNESS OF BREATH: 0
RHINORRHEA: 0
RHINORRHEA: 0
BACK PAIN: 0
VOMITING: 0
ALLERGIC/IMMUNOLOGIC NEGATIVE: 1
RHINORRHEA: 0
SORE THROAT: 0
VOMITING: 0
ABDOMINAL PAIN: 0
DIARRHEA: 0
EYE DISCHARGE: 0
ALLERGIC/IMMUNOLOGIC NEGATIVE: 1
COUGH: 0
DIARRHEA: 0
NAUSEA: 1
COUGH: 0
NAUSEA: 1
ALLERGIC/IMMUNOLOGIC NEGATIVE: 1
CHEST TIGHTNESS: 0
VOMITING: 0
BLOOD IN STOOL: 0
SINUS PRESSURE: 0
CONSTIPATION: 0
SORE THROAT: 0
CONSTIPATION: 0
BACK PAIN: 0
CONSTIPATION: 0
ALLERGIC/IMMUNOLOGIC NEGATIVE: 1
BACK PAIN: 0
ALLERGIC/IMMUNOLOGIC NEGATIVE: 1
SINUS PRESSURE: 0
ABDOMINAL PAIN: 0
DIARRHEA: 0
WHEEZING: 0
CHEST TIGHTNESS: 0
ABDOMINAL PAIN: 0
ALLERGIC/IMMUNOLOGIC NEGATIVE: 1
SINUS PRESSURE: 0
SHORTNESS OF BREATH: 0
CONSTIPATION: 0
ALLERGIC/IMMUNOLOGIC NEGATIVE: 1
VOMITING: 0
WHEEZING: 0
BLOOD IN STOOL: 0
BACK PAIN: 0
WHEEZING: 0
SORE THROAT: 0
RHINORRHEA: 0
NAUSEA: 1
EYE PAIN: 0
RHINORRHEA: 0

## 2017-01-01 ASSESSMENT — PATIENT HEALTH QUESTIONNAIRE - PHQ9
SUM OF ALL RESPONSES TO PHQ QUESTIONS 1-9: 0
SUM OF ALL RESPONSES TO PHQ9 QUESTIONS 1 & 2: 0
2. FEELING DOWN, DEPRESSED OR HOPELESS: 0
1. LITTLE INTEREST OR PLEASURE IN DOING THINGS: 0

## 2017-01-06 ENCOUNTER — CARE COORDINATION (OUTPATIENT)
Dept: CARE COORDINATION | Age: 72
End: 2017-01-06

## 2017-01-11 ENCOUNTER — CARE COORDINATION (OUTPATIENT)
Dept: CARE COORDINATION | Age: 72
End: 2017-01-11

## 2017-04-16 PROBLEM — H10.10 ALLERGIC CONJUNCTIVITIS: Status: ACTIVE | Noted: 2017-01-01

## 2017-07-11 PROBLEM — G45.9 TIA (TRANSIENT ISCHEMIC ATTACK): Status: ACTIVE | Noted: 2017-01-01

## 2017-07-12 PROBLEM — G45.9 TIA (TRANSIENT ISCHEMIC ATTACK): Status: RESOLVED | Noted: 2017-01-01 | Resolved: 2017-01-01

## 2017-07-12 PROBLEM — E04.1 THYROID NODULE: Status: ACTIVE | Noted: 2017-01-01

## 2017-07-14 PROBLEM — J96.11 CHRONIC RESPIRATORY FAILURE WITH HYPOXIA (HCC): Status: ACTIVE | Noted: 2017-01-01

## 2017-08-24 PROBLEM — G31.84 MILD COGNITIVE IMPAIRMENT WITH MEMORY LOSS: Status: ACTIVE | Noted: 2017-01-01

## 2017-09-23 PROBLEM — H25.019 CATARACT CORTICAL, SENILE: Status: ACTIVE | Noted: 2017-01-01

## 2017-09-23 PROBLEM — H35.00 RETINOPATHY: Status: ACTIVE | Noted: 2017-01-01

## 2017-10-06 NOTE — CARE COORDINATION
Ambulatory Care Coordination Note  CM Risk Score: 11  [facility] Mortality Risk Score: 46.78    ACC: Verona Fuentes, RN    Summary Note:Call to pt for cc f/u. No concerns and report she is doing ok. Has no changes to her baseline cough, no s/s for copd/chf. Reports yulissa called dr. Shantel Ramirez last week re: pt leg swelling and he had her take addl water pill and swelling improved. Taking meds ok. Daughter/grandaughter fill med box. Fbs today 95, denies any hypo/hyperglycemia issues. Will send zone management and continue to follow for support/ed. Care Coordination Interventions    Program Enrollment:  Complex Care  Referral from Primary Care Provider:  No  Suggested Interventions and Community Resources  Zone Management Tools: In Process         Goals Addressed     None          Prior to Admission medications    Medication Sig Start Date End Date Taking?  Authorizing Provider   Liraglutide (VICTOZA) 18 MG/3ML SOPN SC injection Inject 1.2 mg into the skin daily 10/27/17   Demetrio Kendrick MD   rosuvastatin (CRESTOR) 10 MG tablet Take 1 tablet by mouth daily 10/27/17   Demetrio Kendrick MD   Liraglutide (VICTOZA) 18 MG/3ML SOPN SC injection Inject 1.2 mg into the skin daily 10/27/17   Demetrio Kendrick MD   FLUoxetine (PROZAC) 10 MG capsule TAKE ONE CAPSULE BY MOUTH EVERY DAY 10/27/17   Demetrio Kendrick MD   pantoprazole (PROTONIX) 40 MG tablet TAKE 1 TABLET BY MOUTH DAILY 10/26/17   Mirlande Serrato CNP   isosorbide mononitrate (IMDUR) 60 MG extended release tablet TAKE 1 TABLET BY MOUTH DAILY 10/5/17   Demetrio Kendrick MD   melatonin 3 MG TABS tablet Take 1 tablet by mouth daily 9/21/17   Demetrio Kendrick MD   ondansetron (ZOFRAN) 8 MG tablet Take 1 tablet by mouth every 8 hours as needed for Nausea or Vomiting 9/21/17   Demetrio Kendrick MD   furosemide (LASIX) 20 MG tablet Take 1 tablet by mouth 2 times daily 9/15/17   Demetrio Kendrick MD   donepezil (ARICEPT) 5 MG tablet Take 1 tablet by mouth nightly route daily 10/7/15   Patricio Jean MD   loperamide (IMODIUM) 2 MG capsule Take 2 mg by mouth 4 times daily as needed for Diarrhea    Historical Provider, MD   ipratropium-albuterol (DUONEB) 0.5-2.5 (3) MG/3ML SOLN nebulizer solution Take 3 mLs by nebulization every 4 hours as needed for Shortness of Breath. 10/27/14   Kedar Hirsch MD   OXYGEN Inhale 3 L/min into the lungs continuous.  8/18/14   Kedar Hirsch MD       Future Appointments  Date Time Provider Ephraim Mila   11/7/2017 3:15 PM SCHEDULE, [facility] ECHO U[facility] URB ANC [facility]   11/7/2017 4:00 PM Joslyn Valencia MD [facility]   11/9/2017 10:15 AM Patricio Jean MD URB FM PEDS [facility]   11/16/2017 12:30 PM Justino Rosales MD [facility] GEN SURG None

## 2017-10-31 NOTE — TELEPHONE ENCOUNTER
CLINICAL PHARMACY NOTE - Medication Review    Catalino Pisano is a 67 y.o. female referred to a clinical pharmacy specialist given their history of COPD and/or HF and number of home medications. Patient outreach to review medications - Spoke with patient. Patient states she is interested in medication review, although states best to speak with [facility] about her medications. [facility] currently unavailable at time of call - pt states she can be reached at [phone] another day this week. Will continue to attempt outreach to caregiver as appropriate.     Yury Mccarthy, PharmD, [facility] Pharmacist  Direct: [phone]  Dept: [phone] (toll free [phone], option 7)

## 2017-11-01 NOTE — TELEPHONE ENCOUNTER
Attempt made to contact caregiver Judy as discussed with pt. No answer at her listed number. LM to please call back.

## 2017-11-06 NOTE — TELEPHONE ENCOUNTER
Another attempt made to contact caregiver [facility]. No answer, LM. Will also send letter.     CLINICAL PHARMACY CONSULT: MED RECONCILIATION/REVIEW ADDENDUM    For Pharmacy Admin Tracking Only    PHSO: Yes  Time Spent (min): [id number], PharmD  [address]

## 2017-11-06 NOTE — ADDENDUM NOTE
Encounter addended by: Tiago Rhoades on: 11/6/2017  2:11 PM<BR>    Actions taken: Letter status changed

## 2017-11-07 NOTE — ASSESSMENT & PLAN NOTE
Most recent hemoglobin A1c was 8.1, an improvement from 8.6 earlier this year. She follows up with Dr. Abby Heaton for this.

## 2017-11-07 NOTE — PATIENT INSTRUCTIONS
Continue current cardiovascular medications which have been reviewed and discussed individually with you. Primary/secondary prevention is the goal by aggressive risk modification, healthy and therapeutic life style changes for cardiovascular risk reduction. Various goals are discussed and questions answered. Appropriate prescriptions if needed on this visit are addressed. After visit summery is provided. Questions answered and patient verbalizes understanding. Follow up in office in 6 months, sooner if needed.

## 2017-11-07 NOTE — PROGRESS NOTES
Shann Lefort  1945  Georgia Manning MD    Chief complaint and HPI:  Shann Lefort  is a 58-year-old female follows up for coronary artery disease status post PCI in the past, hypertension, hyperlipidemia, pulmonary hypertension secondary to COPD and the chronic respiratory failure. She denies any chest pain, dizziness, palpitations. She is on oxygen 24/7. She denies any orthopnea or paroxysmal nocturnal dyspnea. She occasionally gets some leg swelling since she takes an extra dose of Lasix as authorized by Dr. Yomaira Fang. She does not need to do this more than once or twice a month. Her medications are reviewed and discussed with her and her daughter. Rest of the Cardiovascular system review is otherwise unchanged from prior encounter. Past medical history:  has a past medical history of AVM (arteriovenous malformation); Blood loss anemia; CAD (coronary artery disease); CAD S/P percutaneous coronary angioplasty; Carotid artery obstruction; Chronic respiratory failure ([facility] [address] Congestive heart failure (CHF) ([facility] [address] COPD (chronic obstructive pulmonary disease) ([facility] [address] Depression; Diabetes type 2, controlled ([facility] [address] GERD (gastroesophageal reflux disease); H/O cardiovascular stress test; H/O CT scan of chest; H/O Doppler ultrasound; H/O Doppler ultrasound; H/O echocardiogram; H/O TIA (transient ischemic attack) and stroke; History of blood transfusion; Hyperlipidemia; Hypertension; Left ventricular aneurysm; and Osteoarthritis. Past surgical history:  has a past surgical history that includes Cardiac surgery; Cardiac catheterization (09/16/2016); hernia repair; Colonoscopy; Hysterectomy; Appendectomy; Upper gastrointestinal endoscopy (1/9/2013); and Endoscopy, colon, diagnostic.     Social History:   Social History   Substance Use Topics    Smoking status: Former Smoker     Packs/day: 1.00     Types: Cigarettes     Quit date: 10/26/2014    Smokeless tobacco: Never Used    Alcohol use No     Family 10/12/2016       IMPRESSION and RECOMMENDATIONS:      Diabetes mellitus, type II ([facility] [address]  Most recent hemoglobin A1c was 8.1, an improvement from 8.6 earlier this year. She follows up with Dr. Miguel Davenport for this. Pulmonary HTN ([facility] [address]  Stable compared to echocardiogram done at [address] [place] [address] last year and locally in July this year. Continue continuous 24/7 oxygen therapy. COPD (chronic obstructive pulmonary disease) (HCC)  With chronic respiratory failure, moderate hypertension. This has been stable since last visit. Hyperlipidemia  Triglycerides have been high. Otherwise is well controlled. HTN (hypertension)  Well controlled on current medications, reviewed individually with patient. CAD S/P percutaneous coronary angioplasty  Continue aggressive risk modification for secondary prevention. Clinically, she has been stable from coronary artery disease point of view. Continue current cardiovascular medications which have been reviewed and discussed individually with you. Primary/secondary prevention is the goal by aggressive risk modification, healthy and therapeutic life style changes for cardiovascular risk reduction. Various goals are discussed and questions answered. Appropriate prescriptions if needed on this visit are addressed. After visit summery is provided. Questions answered and patient verbalizes understanding. Follow up in office in 6 months, sooner if needed. Juan David Reilly MD, 11/7/2017 4:17 PM     Please note this report has been partially produced using speech recognition software and may contain errors related to that system including errors in grammar, punctuation, and spelling, as well as words and phrases that may be inappropriate. If there are any questions or concerns please feel free to contact the dictating provider for clarification.

## 2017-11-09 NOTE — PROGRESS NOTES
SUBJECTIVE:    HPI:   Mild cognitive impairment with memory loss  Forgetful intermitantly confused. Worse if not wearing oxygen     Discussed meds for good and ill. aricept can worsen agitaion. Discussed worsening effects of sleepers, anticolinergics    Osteoarthritis  R knee pain and instability. Pulmonary HTN ([facility] [address]  08/2014 echo RVSP 58 mm Hg    Chronic obstructive pulmonary disease (HCC)  24 h o2 discussed need to wear o2 to maximize brain function    Diabetes mellitus, type II ([facility])  Discussed diabetic meds, sugars remain stable    Unstable gait  Uses walker, no recent falls      CHIEF COMPLAINT:    Chief Complaint   Patient presents with    1 Month Follow-Up    Leg Pain     right ,after sitting for a peroid of time, off balance. for about 20 mins of sitting.  Discuss Medications     aricept stop taking       REVIEW OF SYSTEMS:    Review of Systems   Constitutional: Negative for activity change, fatigue and fever. HENT: Negative for congestion, ear pain, rhinorrhea, sinus pressure and sore throat. Eyes: Negative for visual disturbance. Respiratory: Negative for cough, chest tightness, shortness of breath and wheezing. Cardiovascular: Negative for chest pain, palpitations and leg swelling. Gastrointestinal: Positive for nausea. Negative for abdominal pain, blood in stool, constipation, diarrhea and vomiting. Endocrine: Negative for polydipsia, polyphagia and polyuria. Genitourinary: Negative for dysuria, flank pain, hematuria and urgency. Musculoskeletal: Positive for arthralgias, gait problem and myalgias. Negative for back pain and joint swelling. Walks with wheeled walker   Skin: Negative for rash. Allergic/Immunologic: Negative. Neurological: Negative for weakness, numbness and headaches. Hematological: Negative. Psychiatric/Behavioral: Positive for sleep disturbance. Negative for dysphoric mood and hallucinations.        OBJECTIVE  PHYSICAL EXAM:    /70 (Site: Right Arm, Position: Sitting, Cuff Size: Medium Adult)   Pulse 72   Wt 189 lb (85.7 kg)   SpO2 90%   BMI 35.71 kg/m²       Physical Exam   Constitutional: She is oriented to person, place, and time. She appears well-developed and well-nourished. HENT:   Head: Normocephalic and atraumatic. Right Ear: External ear normal.   Left Ear: External ear normal.   Nose: Nose normal.   Mouth/Throat: Oropharynx is clear and moist.   Eyes: Conjunctivae and EOM are normal. Pupils are equal, round, and reactive to light. Neck: Normal range of motion. Neck supple. No JVD present. No tracheal deviation present. No thyromegaly present. Cardiovascular: Normal rate, regular rhythm, normal heart sounds and intact distal pulses. Pulmonary/Chest: Effort normal. She has no wheezes. She has no rales. She exhibits no tenderness. Diminished bases   Abdominal: Soft. Bowel sounds are normal. She exhibits no distension and no mass. There is no tenderness. There is no rebound and no guarding. Musculoskeletal: Normal range of motion. She exhibits no edema or tenderness. R and L leg trace edema     Lymphadenopathy:     She has no cervical adenopathy. Neurological: She is alert and oriented to person, place, and time. She has normal reflexes. She displays normal reflexes. No cranial nerve deficit. She exhibits normal muscle tone. Coordination normal.   Failed Monofilament     Skin: Skin is warm and dry. No rash noted. Feet, skin intact. Feet calus bilat   Psychiatric: She has a normal mood and affect. Her behavior is normal. Judgment and thought content normal.       ASSESSMENT:    1. Type 2 diabetes mellitus with diabetic polyneuropathy, with long-term current use of insulin ([address]    2. Mild cognitive impairment with memory loss    3. Unstable gait    4. Primary osteoarthritis of right knee    5. Pulmonary HTN    6.  Simple chronic bronchitis ([address]        PLAN:    Juanita Velarde was seen today for 1 month follow-up, leg pain

## 2017-11-10 NOTE — TELEPHONE ENCOUNTER
Dr. Devi Serrano MD: comprehensive medication review completed  - Would patient benefit from change to ICS/LABA combo inhaler such as Symbicort? · H/o COPD, appears currently on ICS monotherapy (Flovent HFA), although questionable adherence (per pharmacy last filled 1 year ago); Duoneb on profile but per pharmacy last fill of nebulizer was albuterol 1 yr ago    Please advise or f/u with patient as appropriate. Thank you! Drea Ortega, PharmD, [address] Pharmacist  Direct: [phone]  Dept: [phone] (toll free [phone], option 7)     =======================================================    CLINICAL PHARMACY NOTE - Medication Review  Patient outreach to review medications - Spoke with [facility] who returned requested PharmD phone call. SUBJECTIVE/OBJECTIVE:   Marcelle Carlin is a 67 y.o. female referred to a clinical pharmacy specialist given their history of COPD and/or HF and number of home medications. [facility] did not have a lot of time to talk about medications, but was at pt's home to review medication bottles.     Medications:  Medication Sig    diclofenac sodium (VOLTAREN) 1 % GEL Apply 4 g topically 4 times daily    insulin glargine (TOUJEO SOLOSTAR) 300 UNIT/ML injection pen Inject 50 Units into the skin nightly    rosuvastatin (CRESTOR) 10 MG tablet Take 1 tablet by mouth daily    Liraglutide (VICTOZA) 18 MG/3ML SOPN SC injection Inject 1.2 mg into the skin daily    FLUoxetine (PROZAC) 10 MG capsule TAKE ONE CAPSULE BY MOUTH EVERY DAY    pantoprazole (PROTONIX) 40 MG tablet TAKE 1 TABLET BY MOUTH DAILY    isosorbide mononitrate (IMDUR) 60 MG extended release tablet TAKE 1 TABLET BY MOUTH DAILY    melatonin 3 MG TABS tablet Take 1 tablet by mouth daily    ondansetron (ZOFRAN) 8 MG tablet Take 1 tablet by mouth every 8 hours as needed for Nausea or Vomiting    furosemide (LASIX) 20 MG tablet Take 1 tablet by mouth 2 times daily  - unable to into the lungs continuous. Additional Medications (including OTC/Herbal Supplements):  · Denies    Per Tricia  Flovent LF 11/2/16 Dr. Deirdre Hatch  Symbicort 11/2/16 160/4.5  Albuterol neb 11/2016    Allergies: Allergies   Allergen Reactions    Eliquis [Apixaban] Shortness Of Breath    Warfarin And Related Other (See Comments)     Toxicity.  Ibuprofen     Zithromax [Azithromycin] Hives    Motrin [Ibuprofen Micronized] Rash    Plavix [Clopidogrel Bisulfate] Itching and Rash     Vitals: Wt Readings from Last 3 Encounters:   11/09/17 189 lb (85.7 kg)   11/07/17 188 lb (85.3 kg)   10/27/17 188 lb (85.3 kg)     BP Readings from Last 3 Encounters:   11/09/17 134/70   11/07/17 (!) 138/52   09/21/17 132/68     Pulse Readings from Last 3 Encounters:   11/09/17 72   11/07/17 84   09/21/17 86     Tobacco History:   History   Smoking Status    Former Smoker    Packs/day: 1.00    Types: Cigarettes    Quit date: 10/26/2014   Smokeless Tobacco    Never Used     Immunizations:   Most Recent Immunizations   Administered Date(s) Administered    Influenza Virus Vaccine 10/07/2015    Influenza, Quadv, 3 Years and older, IM 09/21/2017    Influenza, Quadv, 3 yrs and older, IM, Preservative Free 09/27/2016    Pneumococcal 13-valent Conjugate ([id number]) 09/27/2016    Pneumococcal Polysaccharide ([id number]) 05/25/2015     Pulmonary Functions Testing Results:  Per review of notes, PFTS completed Sept 2014  Per inpatient note by Dr. Leanne Calderon 10/23/2014  Review of records show that the patient had pulmonary function tests from September 2014, with an FEV-1 of 1.75 liters (61% of predicted), consistent with moderate obstructive lung disease with a mild response to bronchodilator. Ejection Fraction:  ECHO 7/11/17  EF   is 45-50 %    Lab Results   Component Value Date    LABA1C 8.4 11/09/2017     Unable to discuss blood sugars at this time.     Lab Results   Component Value Date     09/26/2016 Estimated Creatinine Clearance: 55 mL/min (based on SCr of 0.9 mg/dL). EK/10/17  QTc Calculation (Bazett) 413  ms         ASSESSMENT/PLAN:   - Hypertension: Is at BP target of less than 140/90 mmHg. - Lipids: Patient has a history of ASCVD and is therefore a candidate for high-intensity statin therapy based on updated guidelines. On moderate intensity statin. Alee has been Rxed rosuvastatin 20 mg as well as atorvastatin 40 mg in the past. Reasonable to continue moderate intensity statin. - Renal Dosing: No renal adjustments necessary.  - Non-formulary or medications with cost-effective alternatives: none identified/reported. - Medication Interactions: The following clinically significant interactions were identified via OralWise Interaction Analysis as category D or higher:   · Fluoxetine, Ranexa, ondansetron (prn), loperamide (prn): QTc prolongation. Last QTc < 500 ms, continue to monitor risk vs benefit. - Glycemic Goals: A1c less than 8% or PCP discretion Is not at blood glucose goal but is on insulin (basal-bolus) & liraglutide therapy. - Other Clinical Pharmacy Opportunities Assessed:   · COPD: Alee may be on ICS monotherapy? Reportedly has Duoneb and albuterol inhaler but using them prn--also question whether she actually has Duoneb, per pharmacy, LF for nebulizer medication was albuterol 1 year ago; patient denies following with pulmonology. Consider LABA/ICS combo therapy (already has Symbicort inhaler?). Alee was prescribed Spiriva in the past but removed from med list as \"patient choice\". Questionable adherence based on refill history provided by pharmacy, inhalers/nebs LF 2016. Confirmed with pt that she only uses [address], denies getting samples.

## 2017-11-12 NOTE — TELEPHONE ENCOUNTER
We've started it several times and each time the pt stops it.  I will discuss with pt again next visit

## 2017-12-08 NOTE — CARE COORDINATION
10 MG capsule TAKE ONE CAPSULE BY MOUTH EVERY DAY 11/21/17   Roberto Olivera MD   rosuvastatin (CRESTOR) 10 MG tablet Take 1 tablet by mouth daily 11/13/17   Roberto Olivera MD   insulin aspart (NOVOLOG FLEXPEN) 100 UNIT/ML injection pen Inject 20 Units into the skin With each meal 11/10/17      diclofenac sodium (VOLTAREN) 1 % GEL Apply 4 g topically 4 times daily 11/9/17 12/9/17  Roberto Olivera MD   insulin glargine (TOUJEO SOLOSTAR) 300 UNIT/ML injection pen Inject 50 Units into the skin nightly 11/9/17   Roberto Olivera MD   Liraglutide (VICTOZA) 18 MG/3ML SOPN SC injection Inject 1.2 mg into the skin daily 10/27/17   Roberto Olivera MD   isosorbide mononitrate (IMDUR) 60 MG extended release tablet TAKE 1 TABLET BY MOUTH DAILY 10/5/17   Roberto Olivera MD   melatonin 3 MG TABS tablet Take 1 tablet by mouth daily 9/21/17   Roberto Olivera MD   ondansetron (ZOFRAN) 8 MG tablet Take 1 tablet by mouth every 8 hours as needed for Nausea or Vomiting 9/21/17   Roberto Olivera MD   furosemide (LASIX) 20 MG tablet Take 1 tablet by mouth 2 times daily 9/15/17   Roberto Olivera MD   RANEXA 500 MG extended release tablet TAKE ONE TABLET TWO TIMES A DAY 8/14/17   Roberto Olivera MD   lisinopril (PRINIVIL;ZESTRIL) 20 MG tablet Take 1 tablet by mouth daily 7/3/17   Roberto Olivera MD   carvedilol (COREG) 3.125 MG tablet Take 1 tablet by mouth 2 times daily (with meals) 6/20/17   Roberto Olivera MD   MEDICINE SHOPPE PEN NEEDLES 31G X 8 MM MISC USE AS DIRECTED 5/16/17   Roberto Olivera MD   oxybutynin (DITROPAN-XL) 5 MG extended release tablet Take 1 tablet by mouth daily 1/24/17   Roberto Olivera MD   [facility] M[facility]/ACT inhaler Inhale 2 puffs into the lungs 2 times daily  11/2/16   Historical Provider, MD   [facility] walker with seat 11/10/16   Roberto Olivera MD   UNABLE TO FIND Patellar stablizer R leg 11/10/16   Roberto Olivera MD   mupirocin (BACTROBAN) 2 % ointment Apply 3

## 2018-01-01 ENCOUNTER — CARE COORDINATION (OUTPATIENT)
Dept: CARE COORDINATION | Age: 73
End: 2018-01-01

## 2018-01-01 ENCOUNTER — OFFICE VISIT (OUTPATIENT)
Dept: FAMILY MEDICINE CLINIC | Age: 73
End: 2018-01-01

## 2018-01-01 VITALS
OXYGEN SATURATION: 91 % | DIASTOLIC BLOOD PRESSURE: 58 MMHG | WEIGHT: 185.6 LBS | HEART RATE: 62 BPM | RESPIRATION RATE: 18 BRPM | SYSTOLIC BLOOD PRESSURE: 118 MMHG | BODY MASS INDEX: 35.07 KG/M2

## 2018-01-01 DIAGNOSIS — E11.8 DIABETIC FOOT (HCC): ICD-10-CM

## 2018-01-01 DIAGNOSIS — E11.42 TYPE 2 DIABETES MELLITUS WITH DIABETIC POLYNEUROPATHY, WITH LONG-TERM CURRENT USE OF INSULIN (HCC): Primary | Chronic | ICD-10-CM

## 2018-01-01 DIAGNOSIS — F33.41 MAJOR DEPRESSIVE DISORDER, RECURRENT EPISODE, IN PARTIAL REMISSION (HCC): Chronic | ICD-10-CM

## 2018-01-01 DIAGNOSIS — J43.9 PULMONARY EMPHYSEMA, UNSPECIFIED EMPHYSEMA TYPE (HCC): Chronic | ICD-10-CM

## 2018-01-01 DIAGNOSIS — Z79.4 TYPE 2 DIABETES MELLITUS WITH DIABETIC POLYNEUROPATHY, WITH LONG-TERM CURRENT USE OF INSULIN (HCC): Primary | Chronic | ICD-10-CM

## 2018-01-01 DIAGNOSIS — H00.014 HORDEOLUM EXTERNUM OF LEFT UPPER EYELID: ICD-10-CM

## 2018-01-01 DIAGNOSIS — M17.11 PRIMARY OSTEOARTHRITIS OF RIGHT KNEE: ICD-10-CM

## 2018-01-01 DIAGNOSIS — J44.1 COPD EXACERBATION (HCC): ICD-10-CM

## 2018-01-01 DIAGNOSIS — J96.11 CHRONIC RESPIRATORY FAILURE WITH HYPOXIA (HCC): ICD-10-CM

## 2018-01-01 DIAGNOSIS — I50.42 CHRONIC COMBINED SYSTOLIC AND DIASTOLIC CHF (CONGESTIVE HEART FAILURE) (HCC): Chronic | ICD-10-CM

## 2018-01-01 DIAGNOSIS — I10 ESSENTIAL HYPERTENSION: Chronic | ICD-10-CM

## 2018-01-01 DIAGNOSIS — Z79.4 ENCOUNTER FOR LONG-TERM (CURRENT) USE OF INSULIN (HCC): Chronic | ICD-10-CM

## 2018-01-01 DIAGNOSIS — J41.0 SIMPLE CHRONIC BRONCHITIS (HCC): ICD-10-CM

## 2018-01-01 LAB — HBA1C MFR BLD: 8.3 %

## 2018-01-01 PROCEDURE — 3023F SPIROM DOC REV: CPT | Performed by: FAMILY MEDICINE

## 2018-01-01 PROCEDURE — 3014F SCREEN MAMMO DOC REV: CPT | Performed by: FAMILY MEDICINE

## 2018-01-01 PROCEDURE — 1123F ACP DISCUSS/DSCN MKR DOCD: CPT | Performed by: FAMILY MEDICINE

## 2018-01-01 PROCEDURE — 3017F COLORECTAL CA SCREEN DOC REV: CPT | Performed by: FAMILY MEDICINE

## 2018-01-01 PROCEDURE — 1090F PRES/ABSN URINE INCON ASSESS: CPT | Performed by: FAMILY MEDICINE

## 2018-01-01 PROCEDURE — 4040F PNEUMOC VAC/ADMIN/RCVD: CPT | Performed by: FAMILY MEDICINE

## 2018-01-01 PROCEDURE — G8399 PT W/DXA RESULTS DOCUMENT: HCPCS | Performed by: FAMILY MEDICINE

## 2018-01-01 PROCEDURE — G8484 FLU IMMUNIZE NO ADMIN: HCPCS | Performed by: FAMILY MEDICINE

## 2018-01-01 PROCEDURE — G8417 CALC BMI ABV UP PARAM F/U: HCPCS | Performed by: FAMILY MEDICINE

## 2018-01-01 PROCEDURE — G8926 SPIRO NO PERF OR DOC: HCPCS | Performed by: FAMILY MEDICINE

## 2018-01-01 PROCEDURE — 1036F TOBACCO NON-USER: CPT | Performed by: FAMILY MEDICINE

## 2018-01-01 PROCEDURE — 3045F PR MOST RECENT HEMOGLOBIN A1C LEVEL 7.0-9.0%: CPT | Performed by: FAMILY MEDICINE

## 2018-01-01 PROCEDURE — G8427 DOCREV CUR MEDS BY ELIG CLIN: HCPCS | Performed by: FAMILY MEDICINE

## 2018-01-01 PROCEDURE — 83036 HEMOGLOBIN GLYCOSYLATED A1C: CPT | Performed by: FAMILY MEDICINE

## 2018-01-01 PROCEDURE — G8599 NO ASA/ANTIPLAT THER USE RNG: HCPCS | Performed by: FAMILY MEDICINE

## 2018-01-01 PROCEDURE — 99214 OFFICE O/P EST MOD 30 MIN: CPT | Performed by: FAMILY MEDICINE

## 2018-01-01 RX ORDER — TRAMADOL HYDROCHLORIDE 50 MG/1
50 TABLET ORAL EVERY 6 HOURS PRN
Qty: 60 TABLET | Refills: 1 | Status: SHIPPED | OUTPATIENT
Start: 2018-01-01 | End: 2018-02-10

## 2018-01-01 RX ORDER — CEPHALEXIN 500 MG/1
500 CAPSULE ORAL 4 TIMES DAILY
Qty: 40 CAPSULE | Refills: 0 | Status: SHIPPED | OUTPATIENT
Start: 2018-01-01

## 2018-01-01 ASSESSMENT — ENCOUNTER SYMPTOMS
SHORTNESS OF BREATH: 0
COUGH: 0
ALLERGIC/IMMUNOLOGIC NEGATIVE: 1
BACK PAIN: 0
WHEEZING: 0
ABDOMINAL PAIN: 0
CONSTIPATION: 0
SINUS PRESSURE: 0
VOMITING: 0
SORE THROAT: 0
DIARRHEA: 0
RHINORRHEA: 0
NAUSEA: 1
CHEST TIGHTNESS: 0
BLOOD IN STOOL: 0

## 2018-01-11 PROBLEM — H00.014 HORDEOLUM EXTERNUM OF LEFT UPPER EYELID: Status: ACTIVE | Noted: 2018-01-01

## 2018-01-11 NOTE — ASSESSMENT & PLAN NOTE
Left upper eyelid developed a large lump which is getting smaller has been advised to use warm compresses

## 2018-01-11 NOTE — ASSESSMENT & PLAN NOTE
Right knee pain and instability. Interesting because I cannot elicit the instability on direct exam of the family insisted her knee flops when she tries to walk. They state when she sees the chiropractor stranger foot her instability results.   I've advised and orthopedic evaluation but they're not ready to do this at this time

## 2018-01-11 NOTE — PROGRESS NOTES
SUBJECTIVE:    HPI:   Chronic combined systolic and diastolic CHF (congestive heart failure) ([facility])  Moderately decreased LV systolic function, mod. to severe inferior wall hypokinesis, apical akinesis, mild tricuspid regurg., mild aortic valve calcification with mild valvular aortic stenosis by Doppler, EF 35-45%. - pt has very little reserve    Chronic respiratory failure with hypoxia ([facility])  Pt wearing O2 most of time but short term memory problems worse. No documented apraxia, doubt alzheimers suspect chronic hx hypoxia has worsened normal age related short term memory changes. Diabetic foot ([address]  Patient has significant diabetic neuropathy with diabetic changes to the foot. Encounter for long-term (current) use of insulin ([address]  Patient has been on insulin and is tolerating well    HTN (hypertension)  BP (!) 118/58 (Site: Right Arm, Position: Sitting, Cuff Size: Medium Adult)   Pulse 62   Resp 18   Wt 185 lb 9.6 oz (84.2 kg)   SpO2 91%   BMI 35.07 kg/m²        Hordeolum externum of left upper eyelid  Left upper eyelid developed a large lump which is getting smaller has been advised to use warm compresses    Major depressive disorder, recurrent episode, in partial remission ([address]  Depression is doing significantly better on current medication    Osteoarthritis  Right knee pain and instability. Interesting because I cannot elicit the instability on direct exam of the family insisted her knee flops when she tries to walk. They state when she sees the chiropractor stranger foot her instability results. I've advised and orthopedic evaluation but they're not ready to do this at this time    COPD (chronic obstructive pulmonary disease) ([facility])  Severe COPD stable on 24-hour oxygen.   No longer smokes and is not been continued progressively decline    Diabetes mellitus, type II ([address]  Diabetes adequately controlled      CHIEF COMPLAINT:    Chief Complaint   Patient presents with    Follow-up     2 month knee pain and instability. Interesting because I cannot elicit the instability on direct exam of the family insisted her knee flops when she tries to walk. They state when she sees the chiropractor stranger foot her instability results. I've advised and orthopedic evaluation but they're not ready to do this at this time    COPD (chronic obstructive pulmonary disease) ([facility])  Severe COPD stable on 24-hour oxygen.   No longer smokes and is not been continued progressively decline    Diabetes mellitus, type II ([facility]  Diabetes adequately controlled